# Patient Record
Sex: FEMALE | URBAN - METROPOLITAN AREA
[De-identification: names, ages, dates, MRNs, and addresses within clinical notes are randomized per-mention and may not be internally consistent; named-entity substitution may affect disease eponyms.]

---

## 2020-01-17 ENCOUNTER — APPOINTMENT (RX ONLY)
Dept: URBAN - METROPOLITAN AREA CLINIC 349 | Facility: CLINIC | Age: 35
Setting detail: DERMATOLOGY
End: 2020-01-17

## 2020-01-17 DIAGNOSIS — D22 MELANOCYTIC NEVI: ICD-10-CM

## 2020-01-17 DIAGNOSIS — Z71.89 OTHER SPECIFIED COUNSELING: ICD-10-CM

## 2020-01-17 DIAGNOSIS — D485 NEOPLASM OF UNCERTAIN BEHAVIOR OF SKIN: ICD-10-CM

## 2020-01-17 PROBLEM — D22.5 MELANOCYTIC NEVI OF TRUNK: Status: ACTIVE | Noted: 2020-01-17

## 2020-01-17 PROBLEM — D22.72 MELANOCYTIC NEVI OF LEFT LOWER LIMB, INCLUDING HIP: Status: ACTIVE | Noted: 2020-01-17

## 2020-01-17 PROBLEM — D22.4 MELANOCYTIC NEVI OF SCALP AND NECK: Status: ACTIVE | Noted: 2020-01-17

## 2020-01-17 PROBLEM — D22.39 MELANOCYTIC NEVI OF OTHER PARTS OF FACE: Status: ACTIVE | Noted: 2020-01-17

## 2020-01-17 PROBLEM — D48.5 NEOPLASM OF UNCERTAIN BEHAVIOR OF SKIN: Status: ACTIVE | Noted: 2020-01-17

## 2020-01-17 PROBLEM — D22.71 MELANOCYTIC NEVI OF RIGHT LOWER LIMB, INCLUDING HIP: Status: ACTIVE | Noted: 2020-01-17

## 2020-01-17 PROCEDURE — 11102 TANGNTL BX SKIN SINGLE LES: CPT

## 2020-01-17 PROCEDURE — ? OTHER

## 2020-01-17 PROCEDURE — ? OBSERVATION

## 2020-01-17 PROCEDURE — 99202 OFFICE O/P NEW SF 15 MIN: CPT | Mod: 25

## 2020-01-17 PROCEDURE — ? BIOPSY BY SHAVE METHOD

## 2020-01-17 PROCEDURE — ? EDUCATIONAL RESOURCES PROVIDED

## 2020-01-17 PROCEDURE — ? COUNSELING

## 2020-01-17 PROCEDURE — A4550 SURGICAL TRAYS: HCPCS

## 2020-01-17 ASSESSMENT — LOCATION SIMPLE DESCRIPTION DERM
LOCATION SIMPLE: RIGHT THIGH
LOCATION SIMPLE: LEFT FOREHEAD
LOCATION SIMPLE: LOWER BACK
LOCATION SIMPLE: LEFT ANTERIOR NECK
LOCATION SIMPLE: LEFT UPPER BACK
LOCATION SIMPLE: RIGHT LOWER BACK
LOCATION SIMPLE: SUPERIOR FOREHEAD
LOCATION SIMPLE: ABDOMEN
LOCATION SIMPLE: LEFT PRETIBIAL REGION

## 2020-01-17 ASSESSMENT — LOCATION DETAILED DESCRIPTION DERM
LOCATION DETAILED: LEFT SUPERIOR UPPER BACK
LOCATION DETAILED: SUPERIOR MID FOREHEAD
LOCATION DETAILED: LEFT INFERIOR ANTERIOR NECK
LOCATION DETAILED: RIGHT ANTERIOR PROXIMAL THIGH
LOCATION DETAILED: RIGHT SUPERIOR MEDIAL LOWER BACK
LOCATION DETAILED: LEFT PROXIMAL PRETIBIAL REGION
LOCATION DETAILED: EPIGASTRIC SKIN
LOCATION DETAILED: SUPERIOR LUMBAR SPINE
LOCATION DETAILED: LEFT SUPERIOR MEDIAL FOREHEAD

## 2020-01-17 ASSESSMENT — LOCATION ZONE DERM
LOCATION ZONE: NECK
LOCATION ZONE: LEG
LOCATION ZONE: FACE
LOCATION ZONE: TRUNK

## 2020-01-17 NOTE — HPI: SKIN LESIONS
How Severe Is Your Skin Lesion?: mild
Have Your Skin Lesions Been Treated?: not been treated
Is This A New Presentation, Or A Follow-Up?: Skin Lesions
Additional History: Patient is here for a full body skin check. Patient has several moles she would like to have looked at. Patient denies any growing, bleeding or changing. Patient states she has a newer mole on her neck that appeared about three months ago. Patient denies family or personal history of melanoma.

## 2020-01-17 NOTE — PROCEDURE: BIOPSY BY SHAVE METHOD
Type Of Destruction Used: Curettage
Render Post-Care Instructions In Note?: no
Silver Nitrate Text: The wound bed was treated with silver nitrate after the biopsy was performed.
Anesthesia Volume In Cc (Will Not Render If 0): 0.3
Detail Level: Detailed
Curettage Text: The wound bed was treated with curettage after the biopsy was performed.
Hemostasis: Electrodesiccation
Post-Care Instructions: I reviewed with the patient in detail post-care instructions. Patient is to keep the biopsy site dry overnight, and then apply bacitracin twice daily until healed. Patient may apply hydrogen peroxide soaks to remove any crusting.
Electrodesiccation Text: The wound bed was treated with electrodesiccation after the biopsy was performed.
X Size Of Lesion In Cm: 0
Bill For Surgical Tray: yes
Path Notes (To The Dermatopathologist): Check margins
Depth Of Biopsy: dermis
Biopsy Type: H and E
Wound Care: Vaseline
Consent: Written consent was obtained and risks were reviewed including but not limited to scarring, infection, bleeding, scabbing, incomplete removal, nerve damage and allergy to anesthesia.
Electrodesiccation And Curettage Text: The wound bed was treated with electrodesiccation and curettage after the biopsy was performed.
Accession #: global
Billing Type: Third-Party Bill
Biopsy Method: Dermablade
Dressing: Band-Aid
Cryotherapy Text: The wound bed was treated with cryotherapy after the biopsy was performed.
Notification Instructions: Patient will be notified of biopsy results. However, patient instructed to call the office if not contacted within 2 weeks.
Anesthesia Type: 2% lidocaine with epinephrine

## 2020-01-17 NOTE — PROCEDURE: OTHER
Other (Free Text): ClIre offered to biopsy lesion on back but patient comfrtoabke  to watch it for three months.
Note Text (......Xxx Chief Complaint.): This diagnosis correlates with the
Detail Level: Zone

## 2022-03-18 PROBLEM — Z98.891 HISTORY OF C-SECTION: Status: ACTIVE | Noted: 2022-01-28

## 2022-03-19 PROBLEM — Z82.49 FAMILY HISTORY OF WOLFF-PARKINSON-WHITE (WPW) SYNDROME: Status: ACTIVE | Noted: 2022-01-28

## 2022-03-20 PROBLEM — O09.529 ANTEPARTUM MULTIGRAVIDA OF ADVANCED MATERNAL AGE: Status: ACTIVE | Noted: 2022-01-28

## 2022-04-11 PROBLEM — O34.80 OVARIAN CYST AFFECTING PREGNANCY, ANTEPARTUM: Status: ACTIVE | Noted: 2022-04-07

## 2022-04-11 PROBLEM — N83.209 OVARIAN CYST AFFECTING PREGNANCY, ANTEPARTUM: Status: ACTIVE | Noted: 2022-04-07

## 2022-05-18 VITALS — WEIGHT: 145 LBS | SYSTOLIC BLOOD PRESSURE: 114 MMHG | BODY MASS INDEX: 26.68 KG/M2 | DIASTOLIC BLOOD PRESSURE: 64 MMHG

## 2022-05-18 NOTE — PROGRESS NOTES
BIPIN. G4 . 24w5d   Denies LOF, VB, Ctxs. Good FM.       glucola NV     Has NOT had COVID 19 infxn   Has NOT been COVID 19 vaccinated --has been strongly counseled and medical literature/websites given           OB hx:  G1 C/S 2011 in [facility] --pt reports due to \"cord around baby's neck\"--states they never let her try for vaginal delivery with that pregnancy     G2  19 (39w5d) at [facility]      G3 SAB  G4 current      Desires  --see imaging from preg confirmation US w/ thin scar/fluid collection     no hx GDM or preE/GHTN             AMA (will be 38yo by [facility]):  NIPT low risk, male     ASA 162mg   2022 at [facility]: Normal anatomy/echo; AC 74%, ERINN WNL. Low Risk NIPT. Patient repeately asked why she could not have a TOLAC.  I explained to her was decision between her and her OB.     No follow-up with [facility], refer back PRN  Will plan to [facility] US around 32wks (scheduled today)              Hx C/S x1:  Desires      Booking US:   THERE IS A 1.9 X 1.0CM COMPLEX, FLUID COLLECTION IN THE AREA OF  THE  SCAR CAVITY.  THE ANTERIOR UTERINE WALL APPEARS THIN IN THIS AREA,     No mention in MFM note from [address] [facility]   S/p successful    Will recheck images at [address] [facility]      Electronically signed by Christine Harmon MD at 22 3929

## 2022-06-01 ENCOUNTER — ROUTINE PRENATAL (OUTPATIENT)
Dept: OBGYN CLINIC | Age: 37
End: 2022-06-01
Payer: COMMERCIAL

## 2022-06-01 VITALS — WEIGHT: 148 LBS | SYSTOLIC BLOOD PRESSURE: 116 MMHG | DIASTOLIC BLOOD PRESSURE: 70 MMHG | BODY MASS INDEX: 27.24 KG/M2

## 2022-06-01 DIAGNOSIS — Z13.0 SCREENING FOR IRON DEFICIENCY ANEMIA: ICD-10-CM

## 2022-06-01 DIAGNOSIS — Z28.9 COVID-19 VACCINE DOSE NOT ADMINISTERED: ICD-10-CM

## 2022-06-01 DIAGNOSIS — O09.529 ANTEPARTUM MULTIGRAVIDA OF ADVANCED MATERNAL AGE: Primary | ICD-10-CM

## 2022-06-01 DIAGNOSIS — Z13.1 SCREENING FOR DIABETES MELLITUS: ICD-10-CM

## 2022-06-01 DIAGNOSIS — Z3A.28 28 WEEKS GESTATION OF PREGNANCY: ICD-10-CM

## 2022-06-01 DIAGNOSIS — Z98.891 HISTORY OF C-SECTION: ICD-10-CM

## 2022-06-01 DIAGNOSIS — Z82.49 FAMILY HISTORY OF WOLFF-PARKINSON-WHITE (WPW) SYNDROME: ICD-10-CM

## 2022-06-01 LAB — GLUCOSE 1 HOUR: 173 MG/DL

## 2022-06-01 PROCEDURE — 99902 PR PRENATAL VISIT: CPT | Performed by: OBSTETRICS & GYNECOLOGY

## 2022-06-01 NOTE — PROGRESS NOTES
BIPIN. [id number]. 28w4d   Denies LOF, VB, Ctxs. Good FM. Glucola/CBC today    Rh pos     Tdap counseling-- declines today    Has NOT been COVID 19 vaccinated --has been strongly counseled          OB hx:  G1 C/S 2011 in [facility] --pt reports due to \"cord around baby's neck\"--states they never let her try for vaginal delivery with that pregnancy     G2  19 (39w5d) at [facility]      G3 SAB  G4 current      Desires  --see imaging from preg confirmation US w/ thin scar/fluid collection     no hx GDM or preE/GHTN             AMA (will be 36yo by [facility]):  NIPT low risk, male     ASA 162mg   2022 at [facility]: Normal anatomy/echo; AC 74%, ERINN WNL. Low Risk NIPT. Patient repeately asked why she could not have a TOLAC.  I explained to her was decision between her and her OB.     No follow-up with [facility], refer back PRN  [facility]              Hx C/S x1:  Desires      Booking US:   THERE IS A 1.9 X 1.0CM COMPLEX, FLUID COLLECTION IN THE AREA OF  THE  SCAR CAVITY.  THE ANTERIOR UTERINE WALL APPEARS THIN IN THIS AREA,     No mention in MFM note from [address] [place] [address]   S/p successful    Will recheck images at [address] [place] [address]

## 2022-06-13 NOTE — PROGRESS NOTES
BIPIN. [id number].  30w2d   Denies LOF, VB, Ctxs. Good FM.       Vitals:    22 0824   BP: 112/74        History of   Has FU scan scheduled due to area of thin myometrium near prior scar    Antepartum multigravida of advanced maternal age  Failed 1 hour > will need to complete 3 hr - done today  Kick counts and labor precautions reviewed          Alanna Villarreal DO

## 2022-06-14 ENCOUNTER — ROUTINE PRENATAL (OUTPATIENT)
Dept: OBGYN CLINIC | Age: 37
End: 2022-06-14
Payer: COMMERCIAL

## 2022-06-14 VITALS — DIASTOLIC BLOOD PRESSURE: 74 MMHG | SYSTOLIC BLOOD PRESSURE: 112 MMHG | BODY MASS INDEX: 27.42 KG/M2 | WEIGHT: 149 LBS

## 2022-06-14 DIAGNOSIS — O09.529 ANTEPARTUM MULTIGRAVIDA OF ADVANCED MATERNAL AGE: ICD-10-CM

## 2022-06-14 DIAGNOSIS — R73.09 ABNORMAL GTT (GLUCOSE TOLERANCE TEST): Primary | ICD-10-CM

## 2022-06-14 DIAGNOSIS — Z98.891 HISTORY OF C-SECTION: ICD-10-CM

## 2022-06-14 PROCEDURE — 99902 PR PRENATAL VISIT: CPT | Performed by: OBSTETRICS & GYNECOLOGY

## 2022-06-15 LAB
3 HR GLUCOSE: 128 MG/DL
GLUCOSE 1 HOUR: 180 MG/DL
GLUCOSE P FAST SERPL-MCNC: 71 MG/DL
GLUCOSE TOLERANCE TEST 2 HOUR: 133 MG/DL

## 2022-06-22 ENCOUNTER — ROUTINE PRENATAL (OUTPATIENT)
Dept: OBGYN CLINIC | Age: 37
End: 2022-06-22

## 2022-06-22 VITALS — BODY MASS INDEX: 27.24 KG/M2 | WEIGHT: 148 LBS | SYSTOLIC BLOOD PRESSURE: 120 MMHG | DIASTOLIC BLOOD PRESSURE: 72 MMHG

## 2022-06-22 DIAGNOSIS — O26.893 PELVIC PRESSURE IN PREGNANCY, ANTEPARTUM, THIRD TRIMESTER: ICD-10-CM

## 2022-06-22 DIAGNOSIS — Z98.891 HISTORY OF C-SECTION: ICD-10-CM

## 2022-06-22 DIAGNOSIS — R10.2 PELVIC PRESSURE IN PREGNANCY, ANTEPARTUM, THIRD TRIMESTER: ICD-10-CM

## 2022-06-22 DIAGNOSIS — Z82.49 FAMILY HISTORY OF WOLFF-PARKINSON-WHITE (WPW) SYNDROME: ICD-10-CM

## 2022-06-22 DIAGNOSIS — O09.529 ANTEPARTUM MULTIGRAVIDA OF ADVANCED MATERNAL AGE: Primary | ICD-10-CM

## 2022-06-22 DIAGNOSIS — Z13.0 SCREENING, ANEMIA, DEFICIENCY, IRON: ICD-10-CM

## 2022-06-22 LAB
ERYTHROCYTE [DISTWIDTH] IN BLOOD BY AUTOMATED COUNT: 14.1 % (ref 11.9–14.6)
HCT VFR BLD AUTO: 36.1 % (ref 35.8–46.3)
HGB BLD-MCNC: 11.2 G/DL (ref 11.7–15.4)
MCH RBC QN AUTO: 30.4 PG (ref 26.1–32.9)
MCHC RBC AUTO-ENTMCNC: 31 G/DL (ref 31.4–35)
MCV RBC AUTO: 98.1 FL (ref 79.6–97.8)
NRBC # BLD: 0 K/UL (ref 0–0.2)
PLATELET # BLD AUTO: 238 K/UL (ref 150–450)
PMV BLD AUTO: 9.9 FL (ref 9.4–12.3)
RBC # BLD AUTO: 3.68 M/UL (ref 4.05–5.2)
WBC # BLD AUTO: 6.7 K/UL (ref 4.3–11.1)

## 2022-06-22 PROCEDURE — 99902 PR PRENATAL VISIT: CPT | Performed by: OBSTETRICS & GYNECOLOGY

## 2022-06-22 PROCEDURE — 76816 OB US FOLLOW-UP PER FETUS: CPT | Performed by: OBSTETRICS & GYNECOLOGY

## 2022-06-22 PROCEDURE — 76817 TRANSVAGINAL US OBSTETRIC: CPT | Performed by: OBSTETRICS & GYNECOLOGY

## 2022-06-22 NOTE — PROGRESS NOTES
SHAMAR [id number] 31w4d  Denies LOF, VB. Does c/o periodic cramping -- at most 5-6x/hr. Good FM. Will get urine Cx today -- no sxs other than cramping. PTL precautions given       Passed 3/4 values of 3hr GTT  Instructed to continue limiting carbohydrates     *I do not see a CBC result from the day she did her Glucola or 3hr GTT -- will get today _       Tdap  declined  Has NOT been COVID 19 vaccinated --has been strongly counseled          OB hx:  G1 C/S 2011 in [facility] --pt reports due to \"cord around baby's neck\"--states they never let her try for vaginal delivery with that pregnancy     G2  19 (39w5d) at [facility]      G3 SAB  G4 current      Desires  --see imaging from preg confirmation US w/ thin scar/fluid collection     no hx GDM or preE/GHTN             AMA (will be 36yo by [facility]):  NIPT low risk, male     ASA 162mg   2022 at [facility]: Normal anatomy/echo; AC 74%, ERINN WNL. Low Risk NIPT. Patient repeately asked why she could not have a TOLAC.  I explained to her was decision between her and her OB.     No follow-up with [facility], refer back PRN      Growth US today:  GP 48%, AC 77%, ERINN 14  BPP    Ceph    CL 3.99-4.24 (wnl)        C/S scar small cysitc area just inferior to C/S scar measuring 1.3cm  The anterior myometrium near the scar measures 1.48gm       Instructed to limit carbs   Repeat growth US in 4wks             Hx C/S x1:  Desires      Booking US:   THERE IS A 1.9 X 1.0CM COMPLEX, FLUID COLLECTION IN THE AREA OF THE  SCAR CAVITY.  THE ANTERIOR UTERINE WALL APPEARS THIN IN THIS AREA,     No mention in MFM note from [address] [place] [address]   S/p successful    Repeat images today as above

## 2022-07-11 ENCOUNTER — ROUTINE PRENATAL (OUTPATIENT)
Dept: OBGYN CLINIC | Age: 37
End: 2022-07-11

## 2022-07-11 VITALS — WEIGHT: 148 LBS | DIASTOLIC BLOOD PRESSURE: 68 MMHG | SYSTOLIC BLOOD PRESSURE: 118 MMHG | BODY MASS INDEX: 27.24 KG/M2

## 2022-07-11 DIAGNOSIS — Z98.891 HISTORY OF C-SECTION: ICD-10-CM

## 2022-07-11 DIAGNOSIS — Z82.49 FAMILY HISTORY OF WOLFF-PARKINSON-WHITE (WPW) SYNDROME: ICD-10-CM

## 2022-07-11 DIAGNOSIS — O09.529 ANTEPARTUM MULTIGRAVIDA OF ADVANCED MATERNAL AGE: Primary | ICD-10-CM

## 2022-07-11 PROCEDURE — 99902 PR PRENATAL VISIT: CPT | Performed by: OBSTETRICS & GYNECOLOGY

## 2022-07-11 NOTE — PROGRESS NOTES
BIPIN. [id number] 34w2d  Denies LOF, VB; some irreg Ctxs. Good FM. GBS NV      Passed 3/4 values of 3hr GTT  Instructed to continue limiting carbohydrates         Tdap  declined  Has NOT been COVID 19 vaccinated --has been strongly counseled          OB hx:  G1 C/S 2011 in [facility] --pt reports due to \"cord around baby's neck\"--states they never let her try for vaginal delivery with that pregnancy     G2  19 (39w5d) at [facility]      G3 SAB  G4 current      Desires  --see imaging from preg confirmation US w/ thin scar/fluid collection     no hx GDM or preE/GHTN                 AMA (will be 38yo by [facility]):  NIPT low risk, male     ASA 162mg   2022 at [facility]: Normal anatomy/echo; AC 74%, ERINN WNL. Low Risk NIPT. Patient repeately asked why she could not have a TOLAC.  I explained to her was decision between her and her OB.     No follow-up with [facility], refer back PRN       Growth US :  GP 48%, AC 77%, ERINN 14  BPP    Ceph    CL 3.99-4.24 (wnl)        C/S scar small cysitc area just inferior to C/S scar measuring 1.3cm  The anterior myometrium near the scar measures 1.48gm       Instructed to limit carbs   Repeat growth US at [place] in 2wks ____                Hx C/S x1:  Desires      Booking US:   THERE IS A 1.9 X 1.0CM COMPLEX, FLUID COLLECTION IN THE AREA OF THE  SCAR CAVITY.  THE ANTERIOR UTERINE WALL APPEARS THIN IN THIS AREA,     No mention in MFM note from [address] [place] [address]   S/p successful    Repeat images today as above

## 2022-07-28 ENCOUNTER — ROUTINE PRENATAL (OUTPATIENT)
Dept: OBGYN CLINIC | Age: 37
End: 2022-07-28
Payer: COMMERCIAL

## 2022-07-28 VITALS — BODY MASS INDEX: 27.6 KG/M2 | SYSTOLIC BLOOD PRESSURE: 110 MMHG | DIASTOLIC BLOOD PRESSURE: 70 MMHG | WEIGHT: 150 LBS

## 2022-07-28 DIAGNOSIS — O09.529 ANTEPARTUM MULTIGRAVIDA OF ADVANCED MATERNAL AGE: Primary | ICD-10-CM

## 2022-07-28 DIAGNOSIS — Z82.49 FAMILY HISTORY OF WOLFF-PARKINSON-WHITE (WPW) SYNDROME: ICD-10-CM

## 2022-07-28 DIAGNOSIS — Z98.891 HISTORY OF C-SECTION: ICD-10-CM

## 2022-07-28 DIAGNOSIS — Z36.85 SCREENING, ANTENATAL, FOR STREPTOCOCCUS B: ICD-10-CM

## 2022-07-28 PROCEDURE — 99902 PR PRENATAL VISIT: CPT | Performed by: OBSTETRICS & GYNECOLOGY

## 2022-07-28 PROCEDURE — 76816 OB US FOLLOW-UP PER FETUS: CPT | Performed by: OBSTETRICS & GYNECOLOGY

## 2022-07-28 NOTE — PROGRESS NOTES
BIPIN. [id number] 36w5d  Denies LOF, VB, Ctxs. Good FM. GBS today   SVE: /3           Tdap  declined  Has NOT been COVID 19 vaccinated --has been strongly counseled                AMA (will be 36yo by [facility]):  NIPT low risk, male     ASA 162mg   2022 at [facility]: Normal anatomy/echo; AC 74%, ERINN WNL. Low Risk NIPT. Patient repeately asked why she could not have a TOLAC. I explained to her was decision between her and her OB. No follow-up with [facility], refer back PRN       Growth US :  GP 48%, AC 77%, ERINN 14  BPP    Ceph    CL 3.99-4.24 (wnl)     Growth US  today:  GP 26% (5#15) , AC 27%, ERINN 18  BPP 8   Ceph                 Hx C/S x1:  G1 C/S 2011 in [facility] --pt reports due to \"cord around baby's neck\"--states they never let her try for vaginal delivery with that pregnancy  G2  19 (39w5d) at [facility]    G3 SAB  G4 current        Booking US:   THERE IS A 1.9 X 1.0CM COMPLEX, FLUID COLLECTION IN THE AREA OF THE  SCAR CAVITY. THE ANTERIOR UTERINE WALL APPEARS THIN IN THIS AREA,     No mention in MFM note from [address] [place] [address]   No good data on any increased risk of uterine rupture with this.     Has been counseled on  risks/benefits

## 2022-08-01 LAB
BACTERIA SPEC CULT: NORMAL
SERVICE CMNT-IMP: NORMAL

## 2022-08-04 ENCOUNTER — ROUTINE PRENATAL (OUTPATIENT)
Dept: OBGYN CLINIC | Age: 37
End: 2022-08-04

## 2022-08-04 VITALS — WEIGHT: 149 LBS | BODY MASS INDEX: 27.42 KG/M2 | DIASTOLIC BLOOD PRESSURE: 70 MMHG | SYSTOLIC BLOOD PRESSURE: 116 MMHG

## 2022-08-04 DIAGNOSIS — Z28.9 COVID-19 VACCINE DOSE NOT ADMINISTERED: ICD-10-CM

## 2022-08-04 DIAGNOSIS — Z82.49 FAMILY HISTORY OF WOLFF-PARKINSON-WHITE (WPW) SYNDROME: ICD-10-CM

## 2022-08-04 DIAGNOSIS — O09.529 ANTEPARTUM MULTIGRAVIDA OF ADVANCED MATERNAL AGE: Primary | ICD-10-CM

## 2022-08-04 DIAGNOSIS — Z98.891 HISTORY OF C-SECTION: ICD-10-CM

## 2022-08-04 PROCEDURE — 99902 PR PRENATAL VISIT: CPT | Performed by: OBSTETRICS & GYNECOLOGY

## 2022-08-04 NOTE — PROGRESS NOTES
BIPIN. [id number] 37w5d  Denies LOF, VB, RUCs. Good FM. GBS Neg    SVE: tight /-3         Tdap  declined  Has NOT been COVID 19 vaccinated --has been strongly counseled             AMA (will be 36yo by [facility]):  NIPT low risk, male     ASA 162mg   2022 at [facility]: Normal anatomy/echo; AC 74%, ERNIN WNL. Low Risk NIPT. No follow-up with [facility], refer back PRN     22: GP 48%, AC 77%, ERINN 14  CL 3.99-4.24 (wnl)     22 US:  GP 26% (5#15) , AC 27%, ERINN 18  BPP    Ceph                 Hx C/S x1:  G1 C/S 2011 in [facility] --pt reports due to \"cord around baby's neck\"--states they never let her try for vaginal delivery with that pregnancy  G2  19 (39w5d) at [facility]    G3 SAB  G4 current        Booking US:   THERE IS A 1.9 X 1.0CM COMPLEX, FLUID COLLECTION IN THE AREA OF THE  SCAR CAVITY. THE ANTERIOR UTERINE WALL APPEARS THIN IN THIS AREA,     No mention in MFM note from [address] [place] [address]   No good data on any increased risk of uterine rupture with this.     Has been counseled on  risks/benefits       Pt desires TOLAC

## 2022-08-10 ENCOUNTER — ROUTINE PRENATAL (OUTPATIENT)
Dept: OBGYN CLINIC | Age: 37
End: 2022-08-10

## 2022-08-10 VITALS — WEIGHT: 151 LBS | BODY MASS INDEX: 27.79 KG/M2 | SYSTOLIC BLOOD PRESSURE: 119 MMHG | DIASTOLIC BLOOD PRESSURE: 72 MMHG

## 2022-08-10 DIAGNOSIS — Z98.891 HISTORY OF C-SECTION: ICD-10-CM

## 2022-08-10 DIAGNOSIS — Z28.9 COVID-19 VACCINE DOSE NOT ADMINISTERED: ICD-10-CM

## 2022-08-10 DIAGNOSIS — Z82.49 FAMILY HISTORY OF WOLFF-PARKINSON-WHITE (WPW) SYNDROME: ICD-10-CM

## 2022-08-10 DIAGNOSIS — O09.529 ANTEPARTUM MULTIGRAVIDA OF ADVANCED MATERNAL AGE: Primary | ICD-10-CM

## 2022-08-10 PROCEDURE — 99902 PR PRENATAL VISIT: CPT | Performed by: OBSTETRICS & GYNECOLOGY

## 2022-08-10 NOTE — PROGRESS NOTES
SHAMAR [id number] 38w4d  Denies LOF, VB, Ctxs. Good FM. GBS Neg    SVE: tight /-3   DECLINES membrane sweeping         Tdap  declined  Has NOT been COVID 19 vaccinated --has been strongly counseled              AMA (will be 38yo by [facility]):  NIPT low risk, male     ASA 162mg   2022 at [facility]: Normal anatomy/echo; AC 74%, ERINN WNL. Low Risk NIPT. No follow-up with [facility], refer back PRN     22: GP 48%, AC 77%, ERINN 14  CL 3.99-4.24 (wnl)     22 US:  GP 26% (5#15) , AC 27%, ERINN 18  BPP    Cep               Hx C/S x1:  G1 C/S 2011 in [facility] --pt reports due to \"cord around baby's neck\"--states they never let her try for vaginal delivery with that pregnancy  G2  19 (39w5d) at [facility]    G3 SAB  G4 current        Booking US:   THERE IS A 1.9 X 1.0CM COMPLEX, FLUID COLLECTION IN THE AREA OF THE  SCAR CAVITY. THE ANTERIOR UTERINE WALL APPEARS THIN IN THIS AREA,     No mention in MFM note from [address] [place] [address]   No good data on any increased risk of uterine rupture with this.     Has been counseled on  risks/benefits       Pt desires TOLAC

## 2022-08-18 ENCOUNTER — ROUTINE PRENATAL (OUTPATIENT)
Dept: OBGYN CLINIC | Age: 37
End: 2022-08-18

## 2022-08-18 VITALS — SYSTOLIC BLOOD PRESSURE: 112 MMHG | WEIGHT: 149 LBS | BODY MASS INDEX: 27.42 KG/M2 | DIASTOLIC BLOOD PRESSURE: 68 MMHG

## 2022-08-18 DIAGNOSIS — Z98.891 HISTORY OF C-SECTION: ICD-10-CM

## 2022-08-18 DIAGNOSIS — O09.522 SUPERVISION OF ELDERLY MULTIGRAVIDA, SECOND TRIMESTER: Primary | ICD-10-CM

## 2022-08-18 DIAGNOSIS — O09.529 ANTEPARTUM MULTIGRAVIDA OF ADVANCED MATERNAL AGE: ICD-10-CM

## 2022-08-18 DIAGNOSIS — Z28.9 COVID-19 VACCINE DOSE NOT ADMINISTERED: ICD-10-CM

## 2022-08-18 PROCEDURE — 99902 PR PRENATAL VISIT: CPT | Performed by: OBSTETRICS & GYNECOLOGY

## 2022-08-22 ENCOUNTER — ANESTHESIA EVENT (OUTPATIENT)
Dept: LABOR AND DELIVERY | Age: 37
End: 2022-08-22
Payer: COMMERCIAL

## 2022-08-22 ENCOUNTER — ANESTHESIA (OUTPATIENT)
Dept: LABOR AND DELIVERY | Age: 37
End: 2022-08-22
Payer: COMMERCIAL

## 2022-08-22 ENCOUNTER — HOSPITAL ENCOUNTER (INPATIENT)
Age: 37
LOS: 2 days | Discharge: HOME | End: 2022-08-24
Attending: OBSTETRICS & GYNECOLOGY | Admitting: OBSTETRICS & GYNECOLOGY
Payer: COMMERCIAL

## 2022-08-22 PROBLEM — Z37.9 NORMAL LABOR: Status: ACTIVE | Noted: 2022-08-22

## 2022-08-22 LAB
ABO + RH BLD: NORMAL
BASOPHILS # BLD: 0 K/UL (ref 0–0.2)
BASOPHILS NFR BLD: 0 % (ref 0–2)
BLOOD GROUP ANTIBODIES SERPL: NORMAL
DIFFERENTIAL METHOD BLD: ABNORMAL
EOSINOPHIL # BLD: 0.1 K/UL (ref 0–0.8)
EOSINOPHIL NFR BLD: 1 % (ref 0.5–7.8)
ERYTHROCYTE [DISTWIDTH] IN BLOOD BY AUTOMATED COUNT: 13.8 % (ref 11.9–14.6)
HCT VFR BLD AUTO: 36.1 % (ref 35.8–46.3)
HGB BLD-MCNC: 11.9 G/DL (ref 11.7–15.4)
IMM GRANULOCYTES # BLD AUTO: 0 K/UL (ref 0–0.5)
IMM GRANULOCYTES NFR BLD AUTO: 0 % (ref 0–5)
LYMPHOCYTES # BLD: 2 K/UL (ref 0.5–4.6)
LYMPHOCYTES NFR BLD: 29 % (ref 13–44)
MCH RBC QN AUTO: 30.6 PG (ref 26.1–32.9)
MCHC RBC AUTO-ENTMCNC: 33 G/DL (ref 31.4–35)
MCV RBC AUTO: 92.8 FL (ref 79.6–97.8)
MONOCYTES # BLD: 0.7 K/UL (ref 0.1–1.3)
MONOCYTES NFR BLD: 10 % (ref 4–12)
NEUTS SEG # BLD: 4.1 K/UL (ref 1.7–8.2)
NEUTS SEG NFR BLD: 60 % (ref 43–78)
NRBC # BLD: 0 K/UL (ref 0–0.2)
PLATELET # BLD AUTO: 213 K/UL (ref 150–450)
PMV BLD AUTO: 9.8 FL (ref 9.4–12.3)
RBC # BLD AUTO: 3.89 M/UL (ref 4.05–5.2)
SPECIMEN EXP DATE BLD: NORMAL
WBC # BLD AUTO: 6.8 K/UL (ref 4.3–11.1)

## 2022-08-22 PROCEDURE — 6360000002 HC RX W HCPCS

## 2022-08-22 PROCEDURE — 6360000002 HC RX W HCPCS: Performed by: NURSE ANESTHETIST, CERTIFIED REGISTERED

## 2022-08-22 PROCEDURE — 36415 COLL VENOUS BLD VENIPUNCTURE: CPT

## 2022-08-22 PROCEDURE — 6360000002 HC RX W HCPCS: Performed by: OBSTETRICS & GYNECOLOGY

## 2022-08-22 PROCEDURE — 59025 FETAL NON-STRESS TEST: CPT

## 2022-08-22 PROCEDURE — 62325 NJX INTERLAMINAR CRV/THRC: CPT | Performed by: ANESTHESIOLOGY

## 2022-08-22 PROCEDURE — 6370000000 HC RX 637 (ALT 250 FOR IP): Performed by: OBSTETRICS & GYNECOLOGY

## 2022-08-22 PROCEDURE — 86901 BLOOD TYPING SEROLOGIC RH(D): CPT

## 2022-08-22 PROCEDURE — 99285 EMERGENCY DEPT VISIT HI MDM: CPT

## 2022-08-22 PROCEDURE — 85025 COMPLETE CBC W/AUTO DIFF WBC: CPT

## 2022-08-22 PROCEDURE — 7100000011 HC PHASE II RECOVERY - ADDTL 15 MIN

## 2022-08-22 PROCEDURE — 4A1HXCZ MONITORING OF PRODUCTS OF CONCEPTION, CARDIAC RATE, EXTERNAL APPROACH: ICD-10-PCS | Performed by: OBSTETRICS & GYNECOLOGY

## 2022-08-22 PROCEDURE — 7210000100 HC LABOR FEE PER 1 HR

## 2022-08-22 PROCEDURE — 51701 INSERT BLADDER CATHETER: CPT

## 2022-08-22 PROCEDURE — 2580000003 HC RX 258: Performed by: OBSTETRICS & GYNECOLOGY

## 2022-08-22 PROCEDURE — 1100000000 HC RM PRIVATE

## 2022-08-22 PROCEDURE — 7220000101 HC DELIVERY VAGINAL/SINGLE

## 2022-08-22 PROCEDURE — 7100000010 HC PHASE II RECOVERY - FIRST 15 MIN

## 2022-08-22 RX ORDER — TRANEXAMIC ACID 10 MG/ML
1000 INJECTION, SOLUTION INTRAVENOUS
Status: DISCONTINUED | OUTPATIENT
Start: 2022-08-22 | End: 2022-08-22 | Stop reason: HOSPADM

## 2022-08-22 RX ORDER — SODIUM CHLORIDE 0.9 % (FLUSH) 0.9 %
5-40 SYRINGE (ML) INJECTION PRN
Status: DISCONTINUED | OUTPATIENT
Start: 2022-08-22 | End: 2022-08-24 | Stop reason: HOSPADM

## 2022-08-22 RX ORDER — LANOLIN
CREAM (ML) TOPICAL PRN
Status: DISCONTINUED | OUTPATIENT
Start: 2022-08-22 | End: 2022-08-24 | Stop reason: HOSPADM

## 2022-08-22 RX ORDER — DOCUSATE SODIUM 100 MG/1
100 CAPSULE, LIQUID FILLED ORAL 2 TIMES DAILY
Status: DISCONTINUED | OUTPATIENT
Start: 2022-08-22 | End: 2022-08-24 | Stop reason: HOSPADM

## 2022-08-22 RX ORDER — MAGNESIUM CARB/ALUMINUM HYDROX 105-160MG
120 TABLET,CHEWABLE ORAL DAILY PRN
Status: DISCONTINUED | OUTPATIENT
Start: 2022-08-22 | End: 2022-08-24 | Stop reason: HOSPADM

## 2022-08-22 RX ORDER — SODIUM CHLORIDE 0.9 % (FLUSH) 0.9 %
5-40 SYRINGE (ML) INJECTION EVERY 12 HOURS SCHEDULED
Status: DISCONTINUED | OUTPATIENT
Start: 2022-08-22 | End: 2022-08-22

## 2022-08-22 RX ORDER — SODIUM CHLORIDE, SODIUM LACTATE, POTASSIUM CHLORIDE, CALCIUM CHLORIDE 600; 310; 30; 20 MG/100ML; MG/100ML; MG/100ML; MG/100ML
INJECTION, SOLUTION INTRAVENOUS CONTINUOUS
Status: DISCONTINUED | OUTPATIENT
Start: 2022-08-22 | End: 2022-08-22

## 2022-08-22 RX ORDER — SODIUM CHLORIDE 0.9 % (FLUSH) 0.9 %
5-40 SYRINGE (ML) INJECTION PRN
Status: DISCONTINUED | OUTPATIENT
Start: 2022-08-22 | End: 2022-08-22

## 2022-08-22 RX ORDER — MISOPROSTOL 200 UG/1
200 TABLET ORAL
Status: ACTIVE | OUTPATIENT
Start: 2022-08-22 | End: 2022-08-22

## 2022-08-22 RX ORDER — SODIUM CHLORIDE 9 MG/ML
INJECTION, SOLUTION INTRAVENOUS PRN
Status: DISCONTINUED | OUTPATIENT
Start: 2022-08-22 | End: 2022-08-24 | Stop reason: HOSPADM

## 2022-08-22 RX ORDER — TERBUTALINE SULFATE 1 MG/ML
INJECTION, SOLUTION SUBCUTANEOUS
Status: COMPLETED
Start: 2022-08-22 | End: 2022-08-22

## 2022-08-22 RX ORDER — FENTANYL CITRATE 50 UG/ML
INJECTION, SOLUTION INTRAMUSCULAR; INTRAVENOUS PRN
Status: DISCONTINUED | OUTPATIENT
Start: 2022-08-22 | End: 2022-08-22 | Stop reason: SDUPTHER

## 2022-08-22 RX ORDER — MISOPROSTOL 200 UG/1
800 TABLET ORAL PRN
Status: DISCONTINUED | OUTPATIENT
Start: 2022-08-22 | End: 2022-08-22 | Stop reason: HOSPADM

## 2022-08-22 RX ORDER — IBUPROFEN 600 MG/1
600 TABLET ORAL EVERY 6 HOURS
Status: DISCONTINUED | OUTPATIENT
Start: 2022-08-22 | End: 2022-08-24 | Stop reason: HOSPADM

## 2022-08-22 RX ORDER — CARBOPROST TROMETHAMINE 250 UG/ML
250 INJECTION, SOLUTION INTRAMUSCULAR PRN
Status: DISCONTINUED | OUTPATIENT
Start: 2022-08-22 | End: 2022-08-22 | Stop reason: HOSPADM

## 2022-08-22 RX ORDER — SODIUM CHLORIDE, SODIUM LACTATE, POTASSIUM CHLORIDE, AND CALCIUM CHLORIDE .6; .31; .03; .02 G/100ML; G/100ML; G/100ML; G/100ML
500 INJECTION, SOLUTION INTRAVENOUS PRN
Status: DISCONTINUED | OUTPATIENT
Start: 2022-08-22 | End: 2022-08-22 | Stop reason: HOSPADM

## 2022-08-22 RX ORDER — FAMOTIDINE 20 MG/1
20 TABLET, FILM COATED ORAL 2 TIMES DAILY PRN
Status: DISCONTINUED | OUTPATIENT
Start: 2022-08-22 | End: 2022-08-24 | Stop reason: HOSPADM

## 2022-08-22 RX ORDER — POLYETHYLENE GLYCOL 3350 17 G/17G
17 POWDER, FOR SOLUTION ORAL DAILY
Status: DISCONTINUED | OUTPATIENT
Start: 2022-08-22 | End: 2022-08-24 | Stop reason: HOSPADM

## 2022-08-22 RX ORDER — ONDANSETRON 8 MG/1
8 TABLET, ORALLY DISINTEGRATING ORAL EVERY 8 HOURS PRN
Status: DISCONTINUED | OUTPATIENT
Start: 2022-08-22 | End: 2022-08-24 | Stop reason: HOSPADM

## 2022-08-22 RX ORDER — METHYLERGONOVINE MALEATE 0.2 MG/ML
200 INJECTION INTRAVENOUS PRN
Status: DISCONTINUED | OUTPATIENT
Start: 2022-08-22 | End: 2022-08-22

## 2022-08-22 RX ORDER — METHYLERGONOVINE MALEATE 0.2 MG/ML
200 INJECTION INTRAVENOUS
Status: ACTIVE | OUTPATIENT
Start: 2022-08-22 | End: 2022-08-22

## 2022-08-22 RX ORDER — SODIUM CHLORIDE 9 MG/ML
25 INJECTION, SOLUTION INTRAVENOUS PRN
Status: DISCONTINUED | OUTPATIENT
Start: 2022-08-22 | End: 2022-08-22

## 2022-08-22 RX ORDER — SIMETHICONE 80 MG
80 TABLET,CHEWABLE ORAL EVERY 6 HOURS PRN
Status: DISCONTINUED | OUTPATIENT
Start: 2022-08-22 | End: 2022-08-24 | Stop reason: HOSPADM

## 2022-08-22 RX ORDER — SODIUM CHLORIDE, SODIUM LACTATE, POTASSIUM CHLORIDE, CALCIUM CHLORIDE 600; 310; 30; 20 MG/100ML; MG/100ML; MG/100ML; MG/100ML
INJECTION, SOLUTION INTRAVENOUS CONTINUOUS
Status: DISCONTINUED | OUTPATIENT
Start: 2022-08-22 | End: 2022-08-24 | Stop reason: HOSPADM

## 2022-08-22 RX ORDER — ROPIVACAINE HYDROCHLORIDE 2 MG/ML
INJECTION, SOLUTION EPIDURAL; INFILTRATION; PERINEURAL PRN
Status: DISCONTINUED | OUTPATIENT
Start: 2022-08-22 | End: 2022-08-22 | Stop reason: SDUPTHER

## 2022-08-22 RX ORDER — SODIUM CHLORIDE, SODIUM LACTATE, POTASSIUM CHLORIDE, AND CALCIUM CHLORIDE .6; .31; .03; .02 G/100ML; G/100ML; G/100ML; G/100ML
1000 INJECTION, SOLUTION INTRAVENOUS PRN
Status: DISCONTINUED | OUTPATIENT
Start: 2022-08-22 | End: 2022-08-22 | Stop reason: HOSPADM

## 2022-08-22 RX ORDER — TERBUTALINE SULFATE 1 MG/ML
0.25 INJECTION, SOLUTION SUBCUTANEOUS ONCE
Status: COMPLETED | OUTPATIENT
Start: 2022-08-22 | End: 2022-08-22

## 2022-08-22 RX ORDER — ACETAMINOPHEN 325 MG/1
650 TABLET ORAL EVERY 4 HOURS PRN
Status: DISCONTINUED | OUTPATIENT
Start: 2022-08-22 | End: 2022-08-24 | Stop reason: HOSPADM

## 2022-08-22 RX ORDER — FENTANYL CITRATE 50 UG/ML
INJECTION, SOLUTION INTRAMUSCULAR; INTRAVENOUS
Status: DISPENSED
Start: 2022-08-22 | End: 2022-08-22

## 2022-08-22 RX ORDER — ONDANSETRON 2 MG/ML
4 INJECTION INTRAMUSCULAR; INTRAVENOUS EVERY 6 HOURS PRN
Status: DISCONTINUED | OUTPATIENT
Start: 2022-08-22 | End: 2022-08-22 | Stop reason: HOSPADM

## 2022-08-22 RX ORDER — OXYCODONE HYDROCHLORIDE 5 MG/1
5 TABLET ORAL EVERY 4 HOURS PRN
Status: DISCONTINUED | OUTPATIENT
Start: 2022-08-22 | End: 2022-08-24 | Stop reason: HOSPADM

## 2022-08-22 RX ORDER — SODIUM CHLORIDE 0.9 % (FLUSH) 0.9 %
5-40 SYRINGE (ML) INJECTION EVERY 12 HOURS SCHEDULED
Status: DISCONTINUED | OUTPATIENT
Start: 2022-08-22 | End: 2022-08-23 | Stop reason: ALTCHOICE

## 2022-08-22 RX ADMIN — FENTANYL CITRATE 100 MCG: 50 INJECTION, SOLUTION INTRAMUSCULAR; INTRAVENOUS at 05:18

## 2022-08-22 RX ADMIN — TERBUTALINE SULFATE 0.25 MG: 1 INJECTION, SOLUTION SUBCUTANEOUS at 10:00

## 2022-08-22 RX ADMIN — SODIUM CHLORIDE, POTASSIUM CHLORIDE, SODIUM LACTATE AND CALCIUM CHLORIDE 1000 ML: 600; 310; 30; 20 INJECTION, SOLUTION INTRAVENOUS at 04:15

## 2022-08-22 RX ADMIN — Medication 166.7 ML: at 11:05

## 2022-08-22 RX ADMIN — SODIUM CHLORIDE, POTASSIUM CHLORIDE, SODIUM LACTATE AND CALCIUM CHLORIDE: 600; 310; 30; 20 INJECTION, SOLUTION INTRAVENOUS at 04:16

## 2022-08-22 RX ADMIN — ROPIVACAINE HYDROCHLORIDE 5 ML: 2 INJECTION, SOLUTION EPIDURAL; INFILTRATION at 05:12

## 2022-08-22 RX ADMIN — Medication 120 ML: at 10:55

## 2022-08-22 RX ADMIN — ROPIVACAINE HYDROCHLORIDE 8 ML/HR: 2 INJECTION, SOLUTION EPIDURAL; INFILTRATION at 05:15

## 2022-08-22 RX ADMIN — Medication 87.3 MILLI-UNITS/MIN: at 11:16

## 2022-08-22 RX ADMIN — SODIUM CHLORIDE, POTASSIUM CHLORIDE, SODIUM LACTATE AND CALCIUM CHLORIDE 500 ML: 600; 310; 30; 20 INJECTION, SOLUTION INTRAVENOUS at 05:37

## 2022-08-22 NOTE — PROGRESS NOTES
Met with patient and care discussed. 2 deep variables that responded well to repositioning and moderate variability. SVE 5 cm per RN at 8am.     A/P:  TOLAC: s/p KEYONNA. Changing without augmentation. FHTs now cat 1. Continue to monitor closely.      Alanna Villarreal, DO

## 2022-08-22 NOTE — ANESTHESIA PRE PROCEDURE
Allergies:  No Known Allergies    Problem List:    Patient Active Problem List   Diagnosis Code    COVID-19 vaccine dose not administered Z28.9    History of  Z98.891    Family history of Yunier-Parkinson-White (WPW) syndrome Z82.49    Antepartum multigravida of advanced maternal age O12.46    Ovarian cyst affecting pregnancy, antepartum O33.80, N83.209    Full-term PROM with onset of labor more than 24 hours after rupture O42.12       Past Medical History:  No past medical history on file. Past Surgical History:  No past surgical history on file. Social History:    Social History     Tobacco Use    Smoking status: Not on file    Smokeless tobacco: Not on file   Substance Use Topics    Alcohol use: Not on file                                Counseling given: Not Answered      Vital Signs (Current):   Vitals:    22 0302 22 0309 22 0313 22 0319   BP: 124/82 124/82 124/82 117/72   Pulse: 76 76 76 78   Resp:     Temp: 97.8 °F (36.6 °C) 97.8 °F (36.6 °C) 97.8 °F (36.6 °C) 97.8 °F (36.6 °C)   TempSrc: Oral Oral Oral Oral   Weight:  149 lb (67.6 kg)     Height:  5' (1.524 m)                                                BP Readings from Last 3 Encounters:   22 117/72   22 112/68   08/10/22 119/72       NPO Status:                                                                                 BMI:   Wt Readings from Last 3 Encounters:   22 149 lb (67.6 kg)   22 149 lb (67.6 kg)   08/10/22 151 lb (68.5 kg)     Body mass index is 29.1 kg/m².     CBC:   Lab Results   Component Value Date/Time    WBC 6.8 2022 03:32 AM    RBC 3.89 2022 03:32 AM    HGB 11.9 2022 03:32 AM    HCT 36.1 2022 03:32 AM    MCV 92.8 2022 03:32 AM    RDW 13.8 2022 03:32 AM     2022 03:32 AM       CMP: No results found for: NA, K, CL, CO2, BUN, CREATININE, GFRAA, AGRATIO, LABGLOM, GLUCOSE, GLU, PROT, CALCIUM, BILITOT, ALKPHOS, AST, ALT    POC Tests: No results for input(s): POCGLU, POCNA, POCK, POCCL, POCBUN, POCHEMO, POCHCT in the last 72 hours. Coags: No results found for: PROTIME, INR, APTT    HCG (If Applicable): No results found for: PREGTESTUR, PREGSERUM, HCG, HCGQUANT     ABGs: No results found for: PHART, PO2ART, PEU4NDR, RUN8XWD, BEART, W5BUDPJC     Type & Screen (If Applicable):  No results found for: LABABO, LABRH    Drug/Infectious Status (If Applicable):  No results found for: HIV, HEPCAB    COVID-19 Screening (If Applicable): No results found for: COVID19        Anesthesia Evaluation  Patient summary reviewed and Nursing notes reviewed  Airway: Mallampati: II  TM distance: >3 FB   Neck ROM: full  Mouth opening: > = 3 FB   Dental:          Pulmonary:Negative Pulmonary ROS breath sounds clear to auscultation                             Cardiovascular:  Exercise tolerance: good (>4 METS),           Rhythm: regular  Rate: normal                    Neuro/Psych:   Negative Neuro/Psych ROS              GI/Hepatic/Renal:            ROS comment: Primary C/S - successful . Endo/Other: Negative Endo/Other ROS                    Abdominal:             Vascular: negative vascular ROS. Other Findings:           Anesthesia Plan      epidural     ASA 2             Anesthetic plan and risks discussed with spouse and patient.               Post-op pain plan if not by surgeon: epidural opioid            Varsha Chapin MD   2022

## 2022-08-22 NOTE — PROGRESS NOTES
1050- SVE pt complete. Dr Giovanni Momin made aware, orders to start MD charles at nurses station. 1055- Pt MD charles at bedside, ready for delivery. 80- SVAC of viable baby boy apgars 8&9. Weight 6lbs 4oz. Thick mec noted at delivery.  taken to radiant warmer for assessment. 1105- Recovery started.

## 2022-08-22 NOTE — PROGRESS NOTES
Patient presents to triage from home with complaints of contractions and SROM at 0150. SROM clear pink tinged fluid. Good fetal movement.

## 2022-08-22 NOTE — PROGRESS NOTES
SBAR OUT Report: Mother    Verbal report given to Fareed Roberson RN on this patient, who is now being transferred to MIU for routine progression of patient care. The patient is  wearing a green \"Anesthesia-Duramorph\" band. Report consisted of patient's Situation, Background, Assessment and Recommendations (SBAR).  ID bands were compared with the identification form, and verified with the patient and receiving nurse. Information from the Nurse Handoff Report and the [place] Report was reviewed with the receiving nurse; opportunity for questions and clarification provided.

## 2022-08-22 NOTE — L&D DELIVERY NOTE
Correct Correct Correct   Final Counts Correct Correct Correct   Final Count Personnel: Brooke Mcgovern RN  Final Count Verified By: Chong Garcia RN  Accurate Final Count?: Yes  If the count is incorrect due to Intentionally Retained Foreign Object (IRFO) add the IRFO LDA in Lines/Drains. Add LDA: Link to [facility]       Blood Loss  Mother: Gustavo Almanzar #[id number]     Start of Mother's Information      Delivery Blood Loss  22 0255 - 22 1114      None                 End of Mother's Information  Mother: Gustavo Almanzar #[id number]                Delivery Providers    Delivering clinician: Hanley Scheuermann, DO     Provider Role    Hanley Scheuermann, DO Obstetrician    Joselo Weeks RN Primary Nurse    Nikhil Vega RN Primary  Nurse    Candace Dorado Scrub Tech    Priscila Spear RN Charge Nurse              [place] Assessment    Living Status: Living  Delivery Location Comment: 430     Apgar Scoring Key:    0 1 2    Skin Color: Blue or pale Acrocyanotic Completely pink    Heart Rate: Absent <100 bpm >100 bpm    Reflex Irritability: No response Grimace Cry or active withdrawal    Muscle Tone: Limp Some flexion Active motion    Respiratory Effort: Absent Weak cry; hypoventilation Good, crying                      Skin Color:   Heart Rate:   Reflex Irritability:   Muscle Tone:   Respiratory Effort:    Total:            1 Minute:    0    2    2    2    2    8        Apgar 1 total from OB History    5 Minute:    1    2    2    2    2    9        Apgar 5 total from OB History    10 Minute:              15 Minute:              20 Minute:                        Apgars Assigned By: Ronit Melendez              Resuscitation    Method: Bulb Suction, Stimulation              Measurements      Birth Weight: 2840 g Birth Length: 0.54 m     Head Circumference: 0.34 m Chest Circumference: 0.3 m              Title      Skin to Skin Initiation Date/Time:       Skin to Skin End Date/Time:  of a Viable I on 22  Apgars 8, 9  C/C/+2-->pushed to deliver head onto intact perineum. Body followed easily thereafter. Thick meconium was noted. Delayed cord clamping, baby to mom. Cord clamped/cut. Cord gases were drawn. Placenta delivered S/I/3VC. No lac noted. FF w/ pit and massage. QBL per RN. Mom/baby stable.        Alanna Villarreal, DO

## 2022-08-22 NOTE — PROGRESS NOTES
Safety Teaching reviewed:   Hand hygiene prior to handling the infant. Use of bulb syringe. Bracelets with matching numbers are placed on mother and infant  An infant security tag  (Hugs) is placed on the infant's ankle and monitored  All OB nurses wear pink Employee badges - do not give your baby to anyone without proper identification. Never leave the baby alone in the room. The infant should be placed on their back to sleep. on a firm mattress. No toys should be placed in the crib. (safe sleep video offered to view)  Never shake the baby (video offered to view)  Infant fall prevention - do not sleep with the baby, and place the baby in the crib while ambulating. Mother and Baby Care booklet given to Mother.      With use of interpretor services

## 2022-08-22 NOTE — PROGRESS NOTES
Bedside report received from Toyin Montano RN. Pt care assumed. Pt request IV to be restarted in different location due to it being in the [facility] and beeping all of the time. RN will restart IV after chart check. Pt also needs straight cath.

## 2022-08-22 NOTE — ANESTHESIA PROCEDURE NOTES
Epidural Block    Patient location during procedure: OB  Start time: 8/22/2022 5:01 AM  End time: 8/22/2022 5:08 AM  Reason for block: procedure for pain and labor epidural  Staffing  Anesthesiologist: Renee Melgoza MD  Epidural  Patient position: sitting  Prep: ChloraPrep  Patient monitoring: frequent blood pressure checks and cardiac monitor  Approach: midline  Location: L3-4  Injection technique: KATHIE air and KATHIE saline  Provider prep: mask and sterile gloves  Needle  Needle type: Tuohy   Needle gauge: 20 G  Needle length: 3.5 in  Needle insertion depth: 4 cm  Catheter type: multi-orifice  Catheter size: 19 G  Catheter at skin depth: 9 cm  Test dose: negativeCatheter Secured: tegaderm and tape  Assessment  Hemodynamics: stable  Attempts: 1  Outcomes: uncomplicated  Preanesthetic Checklist  Completed: patient identified, IV checked, risks and benefits discussed, surgical/procedural consents, equipment checked, pre-op evaluation, timeout performed, anesthesia consent given, oxygen available and monitors applied/VS acknowledged

## 2022-08-22 NOTE — PROGRESS NOTES
Patient with deep variable decelerations. Always with good return to baseline and excellent variability. SVE 8/c/0, no caput. Almost reducible to push with contractions. Abdomen soft in between contractions. IUPC deferred so patient could be repositioned. Brethine given. Discussed with patient that she is very close to delivery, but need improvement in [facility]. Watch closely and recheck in 30 minutes to see if she is ready to push.      Alanna Villarreal, DO

## 2022-08-22 NOTE — PROGRESS NOTES
0800- IV restarted x1 attempt in right forearm, LR infusing well. IV removed with cath tip intact. SVE 4-5/90/-1.     0815- Pt placed on left side with peanut ball. 65- RN at bedside, Pt BP low with FHT's in the 60's for less than 2 minutes. Pt placed on right side with peanut ball. Baby recovered with moderate variability. MD at bedside. LR bolus infusing. Pt to call with any needs.

## 2022-08-22 NOTE — PROGRESS NOTES
During language assessment, patient verbalized their preference to communicate via interpreting services due to limited [place] proficiency. Patient's nurse  Emre Werner  was notified and provided language services resources available. Nurse Emre Werner confirmed that [facility] unit is available and working.             Ivana LOVELACE [facility] [employer] [employer]        [phone] (phone)

## 2022-08-23 PROCEDURE — 1100000000 HC RM PRIVATE

## 2022-08-23 NOTE — PROGRESS NOTES
Shift assessment complete see flowsheet. Discussed today plan of care with mother. Mother voiced understanding. No s/s of distress noted. Pt declines scheduled/prn medications at this time. Questions encouraged. Pt to call with needs/concerns.  Pt in bed with call light in reach

## 2022-08-23 NOTE — LACTATION NOTE
This note was copied from a baby's chart. [facility] . Experienced mom. Observed at breast in cradle on R.  Baby fed well. Demonstrated manual lip flange. Encouraged frequent feeding and watch output. Mom reports feedings going well.

## 2022-08-23 NOTE — LACTATION NOTE

## 2022-08-23 NOTE — PROGRESS NOTES
Post-Partum Day Number 1 Progress Note  Alicja Rico  [phone]    Patient doing well post-partum without significant complaint. Voiding without difficulty, normal lochia. Vitals:  Patient Vitals for the past 24 hrs:   BP Temp Temp src Pulse Resp SpO2   22 0745 103/84 98.3 °F (36.8 °C) Oral 63 16 99 %   22 0001 102/77 98.8 °F (37.1 °C) Oral 92 14 97 %   22 1532 109/70 98 °F (36.7 °C) Oral 80 16 97 %   22 1315 106/60 99 °F (37.2 °C) Oral (!) 101 20 98 %   22 1304 114/71 -- -- 100 -- --   22 1236 (!) 123/57 -- -- (!) 118 -- --   22 1221 107/64 -- -- 95 -- --   22 1215 106/67 -- -- 100 -- --   22 1152 104/65 -- -- (!) 109 -- --   22 1136 (!) 94/59 98 °F (36.7 °C) Oral (!) 109 -- --     Temp (24hrs), Av.4 °F (36.9 °C), Min:98 °F (36.7 °C), Max:99 °F (37.2 °C)      Vital signs stable, afebrile. Exam:  Patient in [facility]. Doing well per family and nurses. Labs: No results found for this or any previous visit (from the past 24 hour(s)). Assessment and Plan:  Patient appears to be having uncomplicated post-partum course. Continue routine perineal care and maternal education. Plan discharge tomorrow if no problems occur.

## 2022-08-23 NOTE — CARE COORDINATION
Chart reviewed - no needs identified. SW met with patient to complete initial assessment. SW communicated with family via  Alida Savage (#[id number]). Patient without a PCP. With patient's permission, referral made to [facility] PCP Coordinator. Patient denies any history of postpartum depression/anxiety. Patient given informational packet on  mood & anxiety disorders (resources/education) [facility] [place] Family denies any additional needs from  at this time. Family has 's contact information should any needs/questions arise.     MINDY Brownlee, [address] [facility]   [phone]

## 2022-08-24 VITALS
OXYGEN SATURATION: 98 % | TEMPERATURE: 98.4 F | DIASTOLIC BLOOD PRESSURE: 84 MMHG | RESPIRATION RATE: 16 BRPM | HEIGHT: 60 IN | BODY MASS INDEX: 29.25 KG/M2 | WEIGHT: 149 LBS | HEART RATE: 65 BPM | SYSTOLIC BLOOD PRESSURE: 117 MMHG

## 2022-08-24 PROBLEM — N83.209 OVARIAN CYST AFFECTING PREGNANCY, ANTEPARTUM: Status: ACTIVE | Noted: 2022-04-07

## 2022-08-24 PROBLEM — Z82.49 FAMILY HISTORY OF WOLFF-PARKINSON-WHITE (WPW) SYNDROME: Status: ACTIVE | Noted: 2022-01-28

## 2022-08-24 PROBLEM — O34.80 OVARIAN CYST AFFECTING PREGNANCY, ANTEPARTUM: Status: ACTIVE | Noted: 2022-04-07

## 2022-08-24 PROBLEM — O09.529 ANTEPARTUM MULTIGRAVIDA OF ADVANCED MATERNAL AGE: Status: ACTIVE | Noted: 2022-01-28

## 2022-08-24 NOTE — DISCHARGE INSTRUCTIONS
El período posparto: Instrucciones de cuidado-Instrucciones de cuidado  Postpartum: Care Instructions  Instrucciones de cuidado  Después del parto (período posparto), otoole cuerpo pasa por muchos cambios. Algunos de estos cambios suceden mark varias semanas. [address], el cuerpo comienza a recuperarse y se prepara para el amamantamiento. Es posible que [address] se sienta sensible. Las hormonas pueden cambiar otoole estado de ánimo sin advertencia y sin motivoaparente. Mark las primeras [address] después del parto, muchas mujeres tienen emociones que [place] de wilcox a mattie. Es posible que le resulte difícil dormir. Es posible que llore mucho. [place] se llama \"melancolía de la maternidad\". Estas emociones abrumadoras suelen desaparecer dentro de unos días o semanas. Antionette esimportante hablar con otoole médico acerca de maksim sentimientos. Mark las primeras semanas después del Joaquin, es fácil cansarse demasiado o sentirse [place]. No nicola demasiados esfuerzos. Descanse cada vez que pueda,acepte la ayuda de los demás, coma pricila y daquan abundantes líquidos. En el primer par de Genevieve Dorantes de modesto a navdeep, es posible que otoole médico o partera deseen verla y hacer un plan para cualquier atención de seguimiento que usted pueda necesitar. Probablemente tendrá Lauren Sunny kely completa de posparto dentro de los primeros 3 meses después del Joaquin. En loida momento, otoole médico o partera revisarán otoole recuperación del parto. Él o matti también verán cómo estáenfrentando usted maksim emociones y conversarán sobre maksim inquietudes y preguntas. La atención de seguimiento es srinivasan parte clave de otoole tratamiento y seguridad. Asegúrese de hacer y acudir a todas las citas, y llame a otoole médico si está teniendo problemas. También es srinivasan buena idea saber los [place] de susexámenes y mantener srinivasan lista de los medicamentos que isaac. ¿Cómo puede cuidarse en el hogar? [place] o descanse cuando otoole bebé lo nicola.   Si es posible, permita que nova familiares o nova amigos la ayuden con las tareas del hogar. No intente hacerlo todo usted elham. Si tiene hemorroides o hinchazón o dolor alrededor de la abertura de la vagina, pruebe aplicarse frío y calor. Puede aplicarse hielo o srinivasan compresa fría en la nicole mark 10 a 20 minutos cada vez. Póngase un paño waters entre el hielo y la piel. Además, trate de sentarse en algunos centímetros de agua tibia (baño de asiento) 3 veces al día y después de las evacuaciones. [place] los analgésicos (medicamentos para el dolor) exactamente según las indicaciones. Si el médico le recetó un analgésico, tómelo según las indicaciones. Si no está tomando un analgésico recetado, pregúntele a otoole médico si puede lyla dylan de [employer]. Coma más fibra para evitar el estreñimiento. Incluya alimentos janette panes y cereales integrales, verduras crudas, frutas crudas y secas, y frijoles (habichuelas). Isela mucho líquido. Si tiene srinivasan enfermedad renal, cardíaca o hepática y tiene que restringir los líquidos, hable con otoole médico antes de aumentar la cantidad de líquido que azeem. No se lave el interior de la vagina con líquidos (lavados vaginales). Si tiene puntos de sutura, mantenga la nicole limpia con agua tibia, ya sea echándola o rociándola sobre la nicole externa de la vagina y el ano después de usar el baño. Lleve srinivasan lista de preguntas para hacerle a otoole médico o partera. Nova preguntas podrían ser acerca de lo siguiente:  Cambios en los senos, janette bultos o sensibilidad. Cuándo esperar que regrese otoole período menstrual.  Qué forma de anticoncepción es la más adecuada para usted. El peso que aumentó mark el [facility]. Las opciones de TONIA Mendoza [address]. Elizabeth Riling y bebidas son mejores para usted, sobre todo si está amamantando. Problemas que podría tener con la lactancia. Cuándo puede [employer]. Algunas mujeres yobani vez quieran hablar sobre lubricantes vaginales.   Cualquier sentimiento de tristeza o inquietud que tenga. ¿Cuándo debe pedir ayuda? Llame al 911  en cualquier momento que considere que necesita atención de [place]. Por ejemplo, llame si:    Tiene pensamientos de lastimarse o de hacerle daño a howard bebé o a otra persona. Se desmayó (perdió el conocimiento). Tiene dolor en el pecho, le falta el aire o tose French. Tiene convulsiones. Llame a howard médico ahora mismo o busque atención médica de inmediato si:    Howard sangrado vaginal parece hacerse más abundante. Se siente mareada o aturdida, o que está a punto de [place]. Tiene fiebre. Tiene dolor abdominal nuevo o más intenso. Tiene síntomas de un coágulo de álvaro en la pierna (que se llama trombosis venosa profunda), janette:  Dolor en la pantorrilla, el muslo, la marvin o detrás de la rodilla. Enrojecimiento e hinchazón en la pierna o la marvin. Tiene señales de preeclampsia, janette:  Hinchazón repentina de la flo, las sera o los pies. Nuevos problemas de la vista (janette oscurecimiento, sara borroso o sara puntos). Dolor de saskia intenso. Preste especial atención a los cambios en howard shaheen y asegúrese de comunicarsecon howard médico si:    Tiene nuevo flujo vaginal o marion empeora. Se siente mattie o deprimida. Tiene problemas con los senos o el amamantamiento. ¿Dónde puede encontrar más información en inglés? Brianne Kerr a [email or web address] e ingrese a howard cuenta de MyChart. Chung Balderaso E671 en el Lilibeth Santiago \"Search Health Information\" para más información (en inglés) sobre \"El período posparto: Instrucciones de cuidado-Instrucciones de cuidado. \"     Si no tiene srinivasan cuenta, nicola elizabeth en el enlace \"Sign Up Now\". Revisado: 23 febrero, 2022               Versión del contenido: 13.3  © 1558-4793 Healthwise, [employer]. Las instrucciones de cuidado fueron adaptadas bajo licencia por [facility] ([facility]).  Si usted tiene [place] afección médica o sobre estas instrucciones, siempre pregunte a otoole profesional de shaheen. [facility] niega toda garantía o responsabilidad por otoole uso de esta información.

## 2022-08-24 NOTE — ANESTHESIA POSTPROCEDURE EVALUATION
Department of Anesthesiology  Postprocedure Note    Patient: Jose Luis Freedman  MRN: [id number]  YOB: 1985  Date of evaluation: 8/24/2022      Procedure Summary     Date: 08/22/22 Room / Location:     Anesthesia Start: 0968 Anesthesia Stop: 1100    Procedure: Labor Analgesia Diagnosis:     Scheduled Providers:  Responsible Provider: Rin Penaloza MD    Anesthesia Type: epidural ASA Status: 2          Anesthesia Type: No value filed.     Heri Phase I:      Heri Phase II:        Anesthesia Post Evaluation    Patient location during evaluation: floor  Patient participation: complete - patient participated  Level of consciousness: awake and awake and alert  Airway patency: patent  Nausea & Vomiting: no nausea  Complications: no  Cardiovascular status: hemodynamically stable  Respiratory status: acceptable and BIPAP  Hydration status: euvolemic  Multimodal analgesia pain management approach

## 2022-08-24 NOTE — LACTATION NOTE
This note was copied from a baby's chart. In to see mom and infant for discharge. Mom feels baby is latching and feeding well, no issues. Reviewed discharge information and how to manage period of engorgement in Estonian w/ mom. She has no other needs or questions at this time.

## 2022-08-24 NOTE — LACTATION NOTE

## 2022-10-04 ENCOUNTER — POSTPARTUM VISIT (OUTPATIENT)
Dept: OBGYN CLINIC | Age: 37
End: 2022-10-04

## 2022-10-04 VITALS — BODY MASS INDEX: 27.34 KG/M2 | DIASTOLIC BLOOD PRESSURE: 70 MMHG | WEIGHT: 140 LBS | SYSTOLIC BLOOD PRESSURE: 116 MMHG

## 2022-10-04 DIAGNOSIS — Z30.09 ENCOUNTER FOR COUNSELING REGARDING CONTRACEPTION: ICD-10-CM

## 2022-10-04 PROCEDURE — 99902 PR PRENATAL VISIT: CPT | Performed by: OBSTETRICS & GYNECOLOGY

## 2022-10-04 NOTE — PROGRESS NOTES
CC:  6wk pp visit         HPI:  Del Trevizo  is a 30 y.o. [id number] presenting today for her 6wk post-partum visit after  on 22 at 40w2d with Dr Galina Swain. Pregnancy complicated by  AMA, hx of previous CS, hx of successful , hx of initial CS in [facility] in          Denies any complaints today. Tolerating a regular diet w/out any nausea/vomiting. Voiding w/out issue. Denies diarrhea/constipation or pp fevers. breast feeding--denies any breast issues (erythema, rashes, pain, engorgement, abscesses, etc). Has not yet had sex since her delivery. VB has ceased. She has not yet started any form of contraception. Denies any issues w/ PP Depression/Anxiety            Delivery Method: Vaginal delivery  Delivery Laceration:  none  Status of Baby: Disposition of baby: home         No past medical history on file. No past surgical history on file. Current Outpatient Medications:     Prenatal MV & Min w/FA-DHA (ONE A DAY PRENATAL PO), Take by mouth, Disp: , Rfl:             PE:  Visit Vitals  /74   Ht 5' 9\" (1.753 m)   BMI 33.97 kg/m²         Physical Exam:  Constitutional: She appears well-developed and well-nourished. No distress. HENT:    Head: Normocephalic and atraumatic. Cardiovascular: Regular pulse   Pulmonary/Chest: Effort normal  Skin: She is not diaphoretic. Psychiatric: She has a normal mood and affect. Her behavior is normal. Thought content normal. .             Contraception Counseling:  Discussed all forms of contraception available to her, including OCPs, patches, rings, Depo Provera, LARCs (Parguard vs Mirena vs Lizz, Nexplanons), and permanent sterilization. Discussed benefits and possible risks/SEs of these. Desires  condoms for now.   Potentially interested in Mirena IUD but declines signing the prior auth for now       Assessment/Plan:  24 y.o.  6wks pp:                 -meeting all pp goals              -cleared for intercourse              -RTO for annual GYN exams (Feb 2023)    -Contraception:  condoms for now        Haider Marin MD

## 2023-02-06 ENCOUNTER — OFFICE VISIT (OUTPATIENT)
Dept: OBGYN CLINIC | Age: 38
End: 2023-02-06

## 2023-02-06 VITALS
BODY MASS INDEX: 26.5 KG/M2 | WEIGHT: 135 LBS | DIASTOLIC BLOOD PRESSURE: 78 MMHG | SYSTOLIC BLOOD PRESSURE: 116 MMHG | HEIGHT: 60 IN

## 2023-02-06 DIAGNOSIS — Z01.419 WELL WOMAN EXAM WITH ROUTINE GYNECOLOGICAL EXAM: Primary | ICD-10-CM

## 2023-02-06 DIAGNOSIS — Z12.4 PAP SMEAR FOR CERVICAL CANCER SCREENING: ICD-10-CM

## 2023-02-06 DIAGNOSIS — N39.3 SUI (STRESS URINARY INCONTINENCE, FEMALE): ICD-10-CM

## 2023-02-06 DIAGNOSIS — Z11.51 SCREENING FOR HUMAN PAPILLOMAVIRUS (HPV): ICD-10-CM

## 2023-02-06 PROCEDURE — 99395 PREV VISIT EST AGE 18-39: CPT | Performed by: OBSTETRICS & GYNECOLOGY
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
## 2023-02-11 LAB
CYTOLOGIST CVX/VAG CYTO: NORMAL
CYTOLOGY CVX/VAG DOC THIN PREP: NORMAL
HPV APTIMA: NEGATIVE
HPV GENOTYPE REFLEX: NORMAL
Lab: NORMAL
PATH REPORT.FINAL DX SPEC: NORMAL
STAT OF ADQ CVX/VAG CYTO-IMP: NORMAL
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
